# Patient Record
Sex: FEMALE | Race: WHITE | NOT HISPANIC OR LATINO | Employment: UNEMPLOYED | URBAN - METROPOLITAN AREA
[De-identification: names, ages, dates, MRNs, and addresses within clinical notes are randomized per-mention and may not be internally consistent; named-entity substitution may affect disease eponyms.]

---

## 2018-05-21 ENCOUNTER — TELEPHONE (OUTPATIENT)
Dept: OTOLARYNGOLOGY | Facility: CLINIC | Age: 23
End: 2018-05-21

## 2018-05-21 NOTE — TELEPHONE ENCOUNTER
I offered an appt for tomorrow, she declined,  Her mother would be the one driving her here.  She will see Dr. Denton in the [place] office.

## 2018-05-21 NOTE — TELEPHONE ENCOUNTER
----- Message from Jose A Fraser sent at 5/21/2018 12:23 PM CDT -----  Contact: Pt   Pt would like the  Nurse to give her a call back in regards to a message she sent earlier today .. Contact number [phone]..

## 2018-05-30 ENCOUNTER — OFFICE VISIT (OUTPATIENT)
Dept: OTOLARYNGOLOGY | Facility: CLINIC | Age: 23
End: 2018-05-30
Payer: MEDICAID

## 2018-05-30 VITALS
BODY MASS INDEX: 29.61 KG/M2 | WEIGHT: 167.13 LBS | SYSTOLIC BLOOD PRESSURE: 124 MMHG | HEART RATE: 78 BPM | DIASTOLIC BLOOD PRESSURE: 70 MMHG | HEIGHT: 63 IN

## 2018-05-30 DIAGNOSIS — L76.82 INCISIONAL PAIN: Primary | ICD-10-CM

## 2018-05-30 PROCEDURE — 99213 OFFICE O/P EST LOW 20 MIN: CPT | Mod: S$PBB,,, | Performed by: OTOLARYNGOLOGY

## 2018-05-30 PROCEDURE — 99213 OFFICE O/P EST LOW 20 MIN: CPT | Mod: PBBFAC,PO | Performed by: OTOLARYNGOLOGY

## 2018-05-30 PROCEDURE — 99999 PR PBB SHADOW E&M-EST. PATIENT-LVL III: CPT | Mod: PBBFAC,,, | Performed by: OTOLARYNGOLOGY

## 2018-05-30 RX ORDER — GABAPENTIN 100 MG/1
100 CAPSULE ORAL 3 TIMES DAILY
Qty: 90 CAPSULE | Refills: 2 | Status: SHIPPED | OUTPATIENT
Start: 2018-05-30 | End: 2019-05-30

## 2018-05-30 NOTE — PROGRESS NOTES
Pediatric Otolaryngology- Head & Neck Surgery   Established Patient Visit      Chief Complaint: pain along incision site from surgery    History of Present Illness: Mary returns after excision of a right facial cyst and exploration of the right neck in 2016.  In last several months has .   Since surgery she has done well. No bleeding or swelling. No fever    History reviewed. No pertinent past medical history.    Past Surgical History:   Past Surgical History:   Procedure Laterality Date    ADENOIDECTOMY      excision facial mass Right 08/15/2016    NECK EXPLORATION Right 08/15/2016    TONSILLECTOMY      TYMPANOSTOMY TUBE PLACEMENT         Medications:   Current Outpatient Prescriptions:     gabapentin (NEURONTIN) 100 MG capsule, Take 1 capsule (100 mg total) by mouth 3 (three) times daily., Disp: 90 capsule, Rfl: 2    Allergies: Review of patient's allergies indicates:  No Known Allergies    Family History: No hearing loss. No problems with bleeding or anesthesia.    Social History:   History   Smoking Status    Never Smoker   Smokeless Tobacco    Never Used       Review of Systems:  General: no weight loss, no fever.  Eyes: no change in vision.  Ears: negative for infection, negative for hearing loss, no otorrhea  Nose: negative for rhinorrhea, no obstruction, negative for congestion.  Oral cavity/oropharynx: no infection, negative for snoring.  Neuro/Psych: no seizures, no headaches.  Cardiac: no congenital anomalies, no cyanosis  Pulmonary: no wheezing, no stridor, negative for cough.  Heme: no bleeding disorders, no easy bruising.  Allergies: negative for allergies  GI: negative for reflux, no vomiting, no diarrhea    Physical Exam:  Vitals reviewed.  General: well developed and well appearing 22 y.o. female in no distress.  Face: symmetric movement with no dysmorphic features. Healing incision with sutures intact. Area of excision with soft tissue defect.  Parotid glands are normal.  Eyes: EOMI,  conjunctiva pink.  Ears: Right:  Normal auricle, Canal clear, Tympanic membrane:  normal landmarks and mobility           Left: Normal auricle, Canal clear. Tympanic membrane:  normal landmarks and mobility  Nose: clear secretions, septum midline, turbinates normal.  Mouth: Oral cavity and oropharynx with normal healthy mucosa. Dentition: normal for age. Throat: no tonsil hypertrophy  Tongue midline and mobile, palate elevates symmetrically.   Neck: healed incision with no fluctuance.  no lymphadenopathy, no thyromegaly. Trachea is midline.  Neuro: Cranial nerves 2-12 intact. Awake, alert.         Impression: excision of right facial mass and neck mass in 2016 . Complains of pain/tingling/itching along incision site for several months.     Plan: trial of gabapentin  rtc 3 mo or if gabapentin not effective. If still severe pain can consider MRI to rule out neuroma